# Patient Record
Sex: MALE
[De-identification: names, ages, dates, MRNs, and addresses within clinical notes are randomized per-mention and may not be internally consistent; named-entity substitution may affect disease eponyms.]

---

## 2020-02-19 ENCOUNTER — NEW PATIENT (OUTPATIENT)
Age: 25
End: 2020-02-19

## 2020-02-19 DIAGNOSIS — H53.8: ICD-10-CM

## 2020-02-19 PROCEDURE — 92083 EXTENDED VISUAL FIELD XM: CPT

## 2020-02-19 PROCEDURE — 99203 OFFICE O/P NEW LOW 30 MIN: CPT

## 2020-02-19 ASSESSMENT — TONOMETRY
OD_IOP_MMHG: 20
OS_IOP_MMHG: 21

## 2020-02-19 ASSESSMENT — KERATOMETRY
OD_K2POWER_DIOPTERS: 44.25
OS_AXISANGLE_DEGREES: 171
OD_AXISANGLE2_DEGREES: 99
OS_K1POWER_DIOPTERS: 43.5
OS_AXISANGLE2_DEGREES: 81
OS_K2POWER_DIOPTERS: 44.25
OD_AXISANGLE_DEGREES: 9
OD_K1POWER_DIOPTERS: 43.25

## 2020-02-19 ASSESSMENT — VISUAL ACUITY
OS_CC: 20/20
OD_CC: 20/30+2

## 2020-04-02 ENCOUNTER — APPOINTMENT (OUTPATIENT)
Age: 25
Setting detail: DERMATOLOGY
End: 2020-04-02

## 2020-04-02 DIAGNOSIS — D17 BENIGN LIPOMATOUS NEOPLASM: ICD-10-CM

## 2020-04-02 DIAGNOSIS — D22 MELANOCYTIC NEVI: ICD-10-CM

## 2020-04-02 PROBLEM — D22.39 MELANOCYTIC NEVI OF OTHER PARTS OF FACE: Status: ACTIVE | Noted: 2020-04-02

## 2020-04-02 PROBLEM — D17.1 BENIGN LIPOMATOUS NEOPLASM OF SKIN AND SUBCUTANEOUS TISSUE OF TRUNK: Status: ACTIVE | Noted: 2020-04-02

## 2020-04-02 PROBLEM — J45.909 UNSPECIFIED ASTHMA, UNCOMPLICATED: Status: ACTIVE | Noted: 2020-04-02

## 2020-04-02 PROCEDURE — ? DIAGNOSIS COMMENT

## 2020-04-02 PROCEDURE — ? COUNSELING

## 2020-04-02 PROCEDURE — 99203 OFFICE O/P NEW LOW 30 MIN: CPT

## 2020-04-02 PROCEDURE — ? OBSERVATION AND MEASURE

## 2020-04-02 ASSESSMENT — LOCATION ZONE DERM
LOCATION ZONE: FACE
LOCATION ZONE: TRUNK

## 2020-04-02 ASSESSMENT — LOCATION DETAILED DESCRIPTION DERM
LOCATION DETAILED: LEFT SUPERIOR LATERAL LOWER BACK
LOCATION DETAILED: LEFT INFERIOR PREAURICULAR CHEEK

## 2020-04-02 ASSESSMENT — LOCATION SIMPLE DESCRIPTION DERM
LOCATION SIMPLE: LEFT CHEEK
LOCATION SIMPLE: LEFT LOWER BACK

## 2020-04-02 NOTE — PROCEDURE: OBSERVATION
Detail Level: Detailed
Size Of Lesion: 1.5cm subcutaneous nodule
Size Of Lesion: 5mm x 3mm brown macule

## 2020-04-02 NOTE — PROCEDURE: DIAGNOSIS COMMENT
Comment: He has had pain in the area since injury of the lower back, has been seeing Chiropractor for the pain, not helping. He feels a lump on the left lower back where the pain is, the pain is felt when he bends to lift things. Possible disc problem, vs. pressure from lipoma. Advised to see orthopedic surgeon for evaluation of the back pain. If there is no joint issue can proceed to have lipoma removed by general surgeon
Detail Level: Simple